# Patient Record
Sex: MALE | Race: BLACK OR AFRICAN AMERICAN | NOT HISPANIC OR LATINO | Employment: OTHER | URBAN - METROPOLITAN AREA
[De-identification: names, ages, dates, MRNs, and addresses within clinical notes are randomized per-mention and may not be internally consistent; named-entity substitution may affect disease eponyms.]

---

## 2018-10-06 ENCOUNTER — HOSPITAL ENCOUNTER (OUTPATIENT)
Facility: HOSPITAL | Age: 63
Discharge: HOME | End: 2018-10-07
Attending: EMERGENCY MEDICINE | Admitting: INTERNAL MEDICINE
Payer: MEDICARE

## 2018-10-06 DIAGNOSIS — N17.9 AKI (ACUTE KIDNEY INJURY): ICD-10-CM

## 2018-10-06 DIAGNOSIS — R73.9 HYPERGLYCEMIA: Primary | ICD-10-CM

## 2018-10-06 DIAGNOSIS — E86.9 VOLUME DEPLETION: ICD-10-CM

## 2018-10-06 DIAGNOSIS — E55.9 VITAMIN D DEFICIENCY: Chronic | ICD-10-CM

## 2018-10-06 DIAGNOSIS — R73.9 HYPERGLYCEMIA WITHOUT KETOSIS: ICD-10-CM

## 2018-10-06 DIAGNOSIS — E11.65 UNCONTROLLED TYPE 2 DIABETES MELLITUS WITH HYPERGLYCEMIA: ICD-10-CM

## 2018-10-06 DIAGNOSIS — R53.1 WEAKNESS: ICD-10-CM

## 2018-10-06 PROBLEM — F19.10 POLYSUBSTANCE ABUSE: Status: ACTIVE | Noted: 2018-10-06

## 2018-10-06 PROBLEM — F14.10 COCAINE ABUSE: Status: ACTIVE | Noted: 2018-10-06

## 2018-10-06 PROBLEM — F10.10 ALCOHOL ABUSE: Status: ACTIVE | Noted: 2018-10-06

## 2018-10-06 LAB
ALBUMIN SERPL BCP-MCNC: 4 G/DL
ALLENS TEST: ABNORMAL
ALP SERPL-CCNC: 97 U/L
ALT SERPL W/O P-5'-P-CCNC: 34 U/L
AMPHET+METHAMPHET UR QL: NEGATIVE
ANION GAP SERPL CALC-SCNC: 16 MMOL/L
ANION GAP SERPL CALC-SCNC: 9 MMOL/L
AST SERPL-CCNC: 34 U/L
B-OH-BUTYR BLD STRIP-SCNC: 1.1 MMOL/L
BACTERIA #/AREA URNS HPF: NORMAL /HPF
BARBITURATES UR QL SCN>200 NG/ML: NEGATIVE
BASOPHILS # BLD AUTO: 0.01 K/UL
BASOPHILS NFR BLD: 0.2 %
BENZODIAZ UR QL SCN>200 NG/ML: NEGATIVE
BILIRUB SERPL-MCNC: 1 MG/DL
BILIRUB UR QL STRIP: NEGATIVE
BNP SERPL-MCNC: <10 PG/ML
BUN SERPL-MCNC: 18 MG/DL
BUN SERPL-MCNC: 23 MG/DL
BZE UR QL SCN: NORMAL
CALCIUM SERPL-MCNC: 10 MG/DL
CALCIUM SERPL-MCNC: 9 MG/DL
CANNABINOIDS UR QL SCN: NEGATIVE
CHLORIDE SERPL-SCNC: 85 MMOL/L
CHLORIDE SERPL-SCNC: 98 MMOL/L
CHOLEST SERPL-MCNC: 202 MG/DL
CHOLEST/HDLC SERPL: 6.7 {RATIO}
CLARITY UR: CLEAR
CO2 SERPL-SCNC: 28 MMOL/L
CO2 SERPL-SCNC: 30 MMOL/L
COLOR UR: YELLOW
CREAT SERPL-MCNC: 1.3 MG/DL
CREAT SERPL-MCNC: 2 MG/DL
CREAT UR-MCNC: 31.9 MG/DL
DELSYS: ABNORMAL
DIFFERENTIAL METHOD: ABNORMAL
EOSINOPHIL # BLD AUTO: 0 K/UL
EOSINOPHIL NFR BLD: 0.4 %
ERYTHROCYTE [DISTWIDTH] IN BLOOD BY AUTOMATED COUNT: 11.7 %
EST. GFR  (AFRICAN AMERICAN): 40 ML/MIN/1.73 M^2
EST. GFR  (AFRICAN AMERICAN): >60 ML/MIN/1.73 M^2
EST. GFR  (NON AFRICAN AMERICAN): 35 ML/MIN/1.73 M^2
EST. GFR  (NON AFRICAN AMERICAN): 59 ML/MIN/1.73 M^2
GLUCOSE SERPL-MCNC: 267 MG/DL
GLUCOSE SERPL-MCNC: 737 MG/DL
GLUCOSE SERPL-MCNC: >500 MG/DL (ref 70–110)
GLUCOSE SERPL-MCNC: >500 MG/DL (ref 70–110)
GLUCOSE UR QL STRIP: ABNORMAL
HCO3 UR-SCNC: 28.4 MMOL/L (ref 24–28)
HCT VFR BLD AUTO: 41.3 %
HDLC SERPL-MCNC: 30 MG/DL
HDLC SERPL: 14.9 %
HGB BLD-MCNC: 13.9 G/DL
HGB UR QL STRIP: NEGATIVE
KETONES UR QL STRIP: ABNORMAL
LACTATE SERPL-SCNC: 1.4 MMOL/L
LDLC SERPL CALC-MCNC: 128.8 MG/DL
LEUKOCYTE ESTERASE UR QL STRIP: NEGATIVE
LIPASE SERPL-CCNC: 31 U/L
LYMPHOCYTES # BLD AUTO: 2 K/UL
LYMPHOCYTES NFR BLD: 35.8 %
MAGNESIUM SERPL-MCNC: 2 MG/DL
MCH RBC QN AUTO: 30.2 PG
MCHC RBC AUTO-ENTMCNC: 33.7 G/DL
MCV RBC AUTO: 90 FL
METHADONE UR QL SCN>300 NG/ML: NEGATIVE
MICROSCOPIC COMMENT: NORMAL
MODE: ABNORMAL
MONOCYTES # BLD AUTO: 0.7 K/UL
MONOCYTES NFR BLD: 12.5 %
NEUTROPHILS # BLD AUTO: 2.9 K/UL
NEUTROPHILS NFR BLD: 50.9 %
NITRITE UR QL STRIP: NEGATIVE
NONHDLC SERPL-MCNC: 172 MG/DL
OPIATES UR QL SCN: NEGATIVE
OSMOLALITY SERPL: 317 MOSM/KG
PCO2 BLDA: 41.7 MMHG (ref 35–45)
PCP UR QL SCN>25 NG/ML: NEGATIVE
PH SMN: 7.44 [PH] (ref 7.35–7.45)
PH UR STRIP: 6 [PH] (ref 5–8)
PHOSPHATE SERPL-MCNC: 2 MG/DL
PLATELET # BLD AUTO: 211 K/UL
PMV BLD AUTO: 10.7 FL
PO2 BLDA: 83 MMHG (ref 80–100)
POC BE: 4 MMOL/L
POC SATURATED O2: 97 % (ref 95–100)
POC TCO2: 30 MMOL/L (ref 23–27)
POCT GLUCOSE: 274 MG/DL (ref 70–110)
POTASSIUM SERPL-SCNC: 3.6 MMOL/L
POTASSIUM SERPL-SCNC: 4.2 MMOL/L
PROT SERPL-MCNC: 7.9 G/DL
PROT UR QL STRIP: NEGATIVE
RBC # BLD AUTO: 4.6 M/UL
SAMPLE: ABNORMAL
SITE: ABNORMAL
SODIUM SERPL-SCNC: 129 MMOL/L
SODIUM SERPL-SCNC: 137 MMOL/L
SODIUM UR-SCNC: <20 MMOL/L
SP GR UR STRIP: <=1.005 (ref 1–1.03)
TOXICOLOGY INFORMATION: NORMAL
TRIGL SERPL-MCNC: 216 MG/DL
TROPONIN I SERPL DL<=0.01 NG/ML-MCNC: <0.006 NG/ML
TSH SERPL DL<=0.005 MIU/L-ACNC: 0.42 UIU/ML
URN SPEC COLLECT METH UR: ABNORMAL
UROBILINOGEN UR STRIP-ACNC: NEGATIVE EU/DL
WBC # BLD AUTO: 5.67 K/UL
YEAST URNS QL MICRO: NORMAL

## 2018-10-06 PROCEDURE — G0378 HOSPITAL OBSERVATION PER HR: HCPCS

## 2018-10-06 PROCEDURE — 25000003 PHARM REV CODE 250: Performed by: EMERGENCY MEDICINE

## 2018-10-06 PROCEDURE — 84100 ASSAY OF PHOSPHORUS: CPT

## 2018-10-06 PROCEDURE — 94761 N-INVAS EAR/PLS OXIMETRY MLT: CPT

## 2018-10-06 PROCEDURE — 83690 ASSAY OF LIPASE: CPT

## 2018-10-06 PROCEDURE — 82962 GLUCOSE BLOOD TEST: CPT

## 2018-10-06 PROCEDURE — 80307 DRUG TEST PRSMV CHEM ANLYZR: CPT

## 2018-10-06 PROCEDURE — 84484 ASSAY OF TROPONIN QUANT: CPT

## 2018-10-06 PROCEDURE — 83880 ASSAY OF NATRIURETIC PEPTIDE: CPT

## 2018-10-06 PROCEDURE — 93005 ELECTROCARDIOGRAM TRACING: CPT

## 2018-10-06 PROCEDURE — 82570 ASSAY OF URINE CREATININE: CPT | Mod: 59

## 2018-10-06 PROCEDURE — 81000 URINALYSIS NONAUTO W/SCOPE: CPT | Mod: 59

## 2018-10-06 PROCEDURE — 25000003 PHARM REV CODE 250: Performed by: INTERNAL MEDICINE

## 2018-10-06 PROCEDURE — 84443 ASSAY THYROID STIM HORMONE: CPT

## 2018-10-06 PROCEDURE — 80048 BASIC METABOLIC PNL TOTAL CA: CPT

## 2018-10-06 PROCEDURE — 84300 ASSAY OF URINE SODIUM: CPT

## 2018-10-06 PROCEDURE — 82306 VITAMIN D 25 HYDROXY: CPT

## 2018-10-06 PROCEDURE — 80061 LIPID PANEL: CPT

## 2018-10-06 PROCEDURE — 63600175 PHARM REV CODE 636 W HCPCS: Performed by: STUDENT IN AN ORGANIZED HEALTH CARE EDUCATION/TRAINING PROGRAM

## 2018-10-06 PROCEDURE — 85025 COMPLETE CBC W/AUTO DIFF WBC: CPT

## 2018-10-06 PROCEDURE — 36600 WITHDRAWAL OF ARTERIAL BLOOD: CPT

## 2018-10-06 PROCEDURE — 83930 ASSAY OF BLOOD OSMOLALITY: CPT

## 2018-10-06 PROCEDURE — 83605 ASSAY OF LACTIC ACID: CPT

## 2018-10-06 PROCEDURE — 84300 ASSAY OF URINE SODIUM: CPT | Mod: 91

## 2018-10-06 PROCEDURE — 99285 EMERGENCY DEPT VISIT HI MDM: CPT | Mod: 25

## 2018-10-06 PROCEDURE — 63600175 PHARM REV CODE 636 W HCPCS: Performed by: INTERNAL MEDICINE

## 2018-10-06 PROCEDURE — 96374 THER/PROPH/DIAG INJ IV PUSH: CPT

## 2018-10-06 PROCEDURE — 82010 KETONE BODYS QUAN: CPT

## 2018-10-06 PROCEDURE — 82803 BLOOD GASES ANY COMBINATION: CPT

## 2018-10-06 PROCEDURE — 63600175 PHARM REV CODE 636 W HCPCS: Performed by: EMERGENCY MEDICINE

## 2018-10-06 PROCEDURE — 83735 ASSAY OF MAGNESIUM: CPT

## 2018-10-06 PROCEDURE — 96361 HYDRATE IV INFUSION ADD-ON: CPT

## 2018-10-06 PROCEDURE — 80053 COMPREHEN METABOLIC PANEL: CPT

## 2018-10-06 RX ORDER — SODIUM CHLORIDE 0.9 % (FLUSH) 0.9 %
5 SYRINGE (ML) INJECTION
Status: DISCONTINUED | OUTPATIENT
Start: 2018-10-06 | End: 2018-10-07 | Stop reason: HOSPADM

## 2018-10-06 RX ORDER — SODIUM,POTASSIUM PHOSPHATES 280-250MG
1 POWDER IN PACKET (EA) ORAL
Status: DISCONTINUED | OUTPATIENT
Start: 2018-10-06 | End: 2018-10-07 | Stop reason: HOSPADM

## 2018-10-06 RX ORDER — SODIUM CHLORIDE 9 MG/ML
1000 INJECTION, SOLUTION INTRAVENOUS
Status: COMPLETED | OUTPATIENT
Start: 2018-10-06 | End: 2018-10-06

## 2018-10-06 RX ORDER — GLUCAGON 1 MG
1 KIT INJECTION
Status: DISCONTINUED | OUTPATIENT
Start: 2018-10-06 | End: 2018-10-07 | Stop reason: HOSPADM

## 2018-10-06 RX ORDER — SODIUM CHLORIDE 9 MG/ML
INJECTION, SOLUTION INTRAVENOUS CONTINUOUS
Status: DISCONTINUED | OUTPATIENT
Start: 2018-10-06 | End: 2018-10-06

## 2018-10-06 RX ORDER — IBUPROFEN 200 MG
16 TABLET ORAL
Status: DISCONTINUED | OUTPATIENT
Start: 2018-10-06 | End: 2018-10-07 | Stop reason: HOSPADM

## 2018-10-06 RX ORDER — INSULIN ASPART 100 [IU]/ML
0-5 INJECTION, SOLUTION INTRAVENOUS; SUBCUTANEOUS
Status: DISCONTINUED | OUTPATIENT
Start: 2018-10-06 | End: 2018-10-07 | Stop reason: HOSPADM

## 2018-10-06 RX ORDER — IBUPROFEN 200 MG
24 TABLET ORAL
Status: DISCONTINUED | OUTPATIENT
Start: 2018-10-06 | End: 2018-10-07 | Stop reason: HOSPADM

## 2018-10-06 RX ORDER — HEPARIN SODIUM 5000 [USP'U]/ML
5000 INJECTION, SOLUTION INTRAVENOUS; SUBCUTANEOUS EVERY 12 HOURS
Status: DISCONTINUED | OUTPATIENT
Start: 2018-10-06 | End: 2018-10-07 | Stop reason: HOSPADM

## 2018-10-06 RX ORDER — ASPIRIN 81 MG/1
81 TABLET ORAL DAILY
Status: DISCONTINUED | OUTPATIENT
Start: 2018-10-07 | End: 2018-10-07 | Stop reason: HOSPADM

## 2018-10-06 RX ADMIN — INSULIN HUMAN 10 UNITS: 100 INJECTION, SOLUTION PARENTERAL at 06:10

## 2018-10-06 RX ADMIN — SODIUM CHLORIDE 1000 ML: 0.9 INJECTION, SOLUTION INTRAVENOUS at 04:10

## 2018-10-06 RX ADMIN — POTASSIUM & SODIUM PHOSPHATES POWDER PACK 280-160-250 MG 1 PACKET: 280-160-250 PACK at 10:10

## 2018-10-06 RX ADMIN — SODIUM CHLORIDE 1000 ML: 0.9 INJECTION, SOLUTION INTRAVENOUS at 06:10

## 2018-10-06 RX ADMIN — SODIUM CHLORIDE: 0.9 INJECTION, SOLUTION INTRAVENOUS at 08:10

## 2018-10-06 RX ADMIN — INSULIN DETEMIR 10 UNITS: 100 INJECTION, SOLUTION SUBCUTANEOUS at 10:10

## 2018-10-06 RX ADMIN — POTASSIUM ACETATE: 3.93 INJECTION, SOLUTION, CONCENTRATE INTRAVENOUS at 10:10

## 2018-10-06 RX ADMIN — HEPARIN SODIUM 5000 UNITS: 5000 INJECTION, SOLUTION INTRAVENOUS; SUBCUTANEOUS at 10:10

## 2018-10-06 NOTE — ED NOTES
Patient admits to being a non-compliant diabetic over the last year.  CC today is weakness.  States he does use alcohol and crack but not on a daily basis.  Also states he has lost significant weight over last year

## 2018-10-06 NOTE — ED PROVIDER NOTES
Encounter Date: 10/6/2018    SCRIBE #1 NOTE: I, Leroy Hernandez, am scribing for, and in the presence of,  Dr. Chacko. I have scribed the entire note.       History     Chief Complaint   Patient presents with    Weakness     pt hasnt taken diabetic meds in 1 year, no having polyuria, weakness, sob and back pain.     Isreal Biggs Jr. is a 63 y.o. male who  has a past medical history of Diabetes mellitus, Hypertension, and Renal disorder.    The patient presents to the ED due to weakness. The patient states he is Diabetic but hasn't taken his medication in over a year. The patient states he stopped taking his medication because of the side effects. The patient reports polyuria, weakness, sob, and back pain. The patient also notes cigarette, alcohol, and drug abuse. The patient reports no other symptoms at this time.      The history is provided by the patient.     Review of patient's allergies indicates:  No Known Allergies  Past Medical History:   Diagnosis Date    Diabetes mellitus     Hypertension     Renal disorder      Past Surgical History:   Procedure Laterality Date    BRAIN SURGERY       History reviewed. No pertinent family history.  Social History     Tobacco Use    Smoking status: Current Every Day Smoker     Packs/day: 0.50     Years: 30.00     Pack years: 15.00     Types: Cigarettes   Substance Use Topics    Alcohol use: Yes     Comment: socially    Drug use: No     Review of Systems   Constitutional: Negative for fever.   HENT: Negative for sore throat.    Respiratory: Positive for shortness of breath.    Cardiovascular: Negative for chest pain.   Gastrointestinal: Negative for nausea.   Genitourinary: Positive for frequency. Negative for dysuria.   Musculoskeletal: Positive for back pain.   Skin: Negative for rash.   Neurological: Positive for weakness.   Hematological: Does not bruise/bleed easily.       Physical Exam     Initial Vitals [10/06/18 1536]   BP Pulse Resp Temp SpO2   105/76 92  16 -- 96 %      MAP       --         Physical Exam    Nursing note and vitals reviewed.  Constitutional: He appears well-developed and well-nourished. He is not diaphoretic. No distress.   Cachectic.   HENT:   Head: Normocephalic and atraumatic.   Mouth/Throat: Oropharynx is clear and moist.   Eyes: Conjunctivae and EOM are normal.   Neck: Normal range of motion. Neck supple.   Cardiovascular: Normal rate, regular rhythm and normal heart sounds. Exam reveals no gallop and no friction rub.    No murmur heard.  Pulmonary/Chest: Breath sounds normal. He has no wheezes. He has no rhonchi. He has no rales.   Abdominal: Soft. There is no tenderness. There is no rebound and no guarding.   Musculoskeletal: Normal range of motion. He exhibits no edema or tenderness.   Lymphadenopathy:     He has no cervical adenopathy.   Neurological: He is alert and oriented to person, place, and time. He has normal strength.   Skin: Skin is warm and dry. No rash noted.         ED Course   Procedures  Labs Reviewed   CBC W/ AUTO DIFFERENTIAL - Abnormal; Notable for the following components:       Result Value    Hemoglobin 13.9 (*)     All other components within normal limits   COMPREHENSIVE METABOLIC PANEL - Abnormal; Notable for the following components:    Sodium 129 (*)     Chloride 85 (*)     Glucose 737 (*)     Creatinine 2.0 (*)     eGFR if  40 (*)     eGFR if non  35 (*)     All other components within normal limits    Narrative:     GLU critical result(s) called and verbal readback obtained from Lucho Rowe RN on 10/06/18 at 17:16 by Bekah Poon., 10/06/2018 17:16   URINALYSIS, REFLEX TO URINE CULTURE - Abnormal; Notable for the following components:    Specific Gravity, UA <=1.005 (*)     Glucose, UA 3+ (*)     Ketones, UA 1+ (*)     All other components within normal limits    Narrative:     Preferred Collection Type->Urine, Clean Catch   POCT GLUCOSE MONITORING CONTINUOUS - Abnormal   B-TYPE  NATRIURETIC PEPTIDE   DRUG SCREEN PANEL, URINE EMERGENCY    Narrative:     Preferred Collection Type->Urine, Clean Catch   LACTIC ACID, PLASMA   LIPASE   TROPONIN I   MAGNESIUM   URINALYSIS MICROSCOPIC    Narrative:     Preferred Collection Type->Urine, Clean Catch     EKG Readings: (Independently Interpreted)   Initial Reading: No STEMI. Heart Rate: 77. Ectopy: No Ectopy. ST Segments: Normal ST Segments. Axis: Left Axis Deviation. Other Impression: LVH   1703       Imaging Results          X-Ray Chest PA And Lateral (Final result)  Result time 10/06/18 16:37:03    Final result by Lani Milan MD (10/06/18 16:37:03)                 Impression:      Interval clearing of abnormal pulmonary parenchymal opacification with no other interval change since February 18, 2015.      Electronically signed by: Lani Milan MD  Date:    10/06/2018  Time:    16:37             Narrative:    EXAMINATION:  XR CHEST PA AND LATERAL    CLINICAL HISTORY:  Weakness    TECHNIQUE:  PA and lateral views of the chest were performed.    COMPARISON:  February 18, 2015    FINDINGS:  Mild dilatation of the trachea remains.The lungs are over expanded but free of lobar consolidation and alveolar edema, with normal appearance of pulmonary vasculature and no pleural effusion or pneumothorax.    The cardiac silhouette is normal in size. The hilar and mediastinal contours are unremarkable.    Bones are intact.                                 Medical Decision Making:   Initial Assessment:   Isreal Biggs Jr. is a 63 y.o. male who presents to the ED due to weakness. His glucose is 737.  The patient was given IV fluids and 10 units of insulin IV %period% creatinine is 2.0.  The patient will be admitted for glucose control and hydration status.  Clinical Tests:   Lab Tests: Ordered and Reviewed  Radiological Study: Ordered and Reviewed  Medical Tests: Ordered and Reviewed                   ED Course as of Oct 06 1833   Sat Oct 06, 2018   1833  [facility] internal Medicine consulted.  Patient is a [facility] patient.  [ST]      ED Course User Index  [ST] Nuha Chacko MD     Clinical Impression:     1. Hyperglycemia    2. Weakness             I, Nuha Chacko, personally performed the services described in this documentation. All medical record entries made by the scribe were at my direction and in my presence.  I have reviewed the chart and agree that the record reflects my personal performance and is accurate and complete. Nuha Chacko M.D. 6:32 PM10/06/2018                    Nuha Chacko MD  10/06/18 1833       Nuha Chacko MD  10/06/18 2044

## 2018-10-07 VITALS
TEMPERATURE: 98 F | HEIGHT: 64 IN | RESPIRATION RATE: 17 BRPM | DIASTOLIC BLOOD PRESSURE: 85 MMHG | WEIGHT: 120.38 LBS | OXYGEN SATURATION: 95 % | HEART RATE: 56 BPM | BODY MASS INDEX: 20.55 KG/M2 | SYSTOLIC BLOOD PRESSURE: 139 MMHG

## 2018-10-07 PROBLEM — E83.39 HYPOPHOSPHATEMIA: Status: ACTIVE | Noted: 2018-10-07

## 2018-10-07 PROBLEM — E78.5 HYPERLIPIDEMIA ASSOCIATED WITH TYPE 2 DIABETES MELLITUS: Status: ACTIVE | Noted: 2018-10-07

## 2018-10-07 PROBLEM — E11.69 HYPERLIPIDEMIA ASSOCIATED WITH TYPE 2 DIABETES MELLITUS: Status: ACTIVE | Noted: 2018-10-07

## 2018-10-07 LAB
25(OH)D3+25(OH)D2 SERPL-MCNC: 26 NG/ML
25(OH)D3+25(OH)D2 SERPL-MCNC: 32 NG/ML
ALBUMIN SERPL BCP-MCNC: 3 G/DL
ALP SERPL-CCNC: 60 U/L
ALT SERPL W/O P-5'-P-CCNC: 26 U/L
ANION GAP SERPL CALC-SCNC: 7 MMOL/L
AST SERPL-CCNC: 33 U/L
BASOPHILS # BLD AUTO: 0 K/UL
BASOPHILS NFR BLD: 0 %
BILIRUB SERPL-MCNC: 1 MG/DL
BUN SERPL-MCNC: 14 MG/DL
CALCIUM SERPL-MCNC: 8.4 MG/DL
CHLORIDE SERPL-SCNC: 102 MMOL/L
CO2 SERPL-SCNC: 30 MMOL/L
CREAT SERPL-MCNC: 1.1 MG/DL
CREAT UR-MCNC: 102.4 MG/DL
DIFFERENTIAL METHOD: ABNORMAL
EOSINOPHIL # BLD AUTO: 0 K/UL
EOSINOPHIL NFR BLD: 0.6 %
ERYTHROCYTE [DISTWIDTH] IN BLOOD BY AUTOMATED COUNT: 11.5 %
EST. GFR  (AFRICAN AMERICAN): >60 ML/MIN/1.73 M^2
EST. GFR  (NON AFRICAN AMERICAN): >60 ML/MIN/1.73 M^2
ESTIMATED AVG GLUCOSE: ABNORMAL MG/DL
FERRITIN SERPL-MCNC: 304 NG/ML
GLUCOSE SERPL-MCNC: 241 MG/DL
HBA1C MFR BLD HPLC: >14 %
HCT VFR BLD AUTO: 36.4 %
HGB BLD-MCNC: 12.5 G/DL
IRON SERPL-MCNC: 104 UG/DL
LYMPHOCYTES # BLD AUTO: 3 K/UL
LYMPHOCYTES NFR BLD: 58.6 %
MCH RBC QN AUTO: 30.6 PG
MCHC RBC AUTO-ENTMCNC: 34.3 G/DL
MCV RBC AUTO: 89 FL
MONOCYTES # BLD AUTO: 0.5 K/UL
MONOCYTES NFR BLD: 9.4 %
NEUTROPHILS # BLD AUTO: 1.6 K/UL
NEUTROPHILS NFR BLD: 31.2 %
PLATELET # BLD AUTO: 190 K/UL
PMV BLD AUTO: 10.3 FL
POCT GLUCOSE: 241 MG/DL (ref 70–110)
POCT GLUCOSE: 263 MG/DL (ref 70–110)
POTASSIUM SERPL-SCNC: 4.2 MMOL/L
PROT SERPL-MCNC: 6.3 G/DL
RBC # BLD AUTO: 4.09 M/UL
SATURATED IRON: 36 %
SODIUM SERPL-SCNC: 139 MMOL/L
SODIUM UR-SCNC: 48 MMOL/L
TOTAL IRON BINDING CAPACITY: 287 UG/DL
TRANSFERRIN SERPL-MCNC: 194 MG/DL
TRANSFERRIN SERPL-MCNC: 194 MG/DL
WBC # BLD AUTO: 5.12 K/UL

## 2018-10-07 PROCEDURE — G0378 HOSPITAL OBSERVATION PER HR: HCPCS

## 2018-10-07 PROCEDURE — 90670 PCV13 VACCINE IM: CPT | Performed by: INTERNAL MEDICINE

## 2018-10-07 PROCEDURE — G0009 ADMIN PNEUMOCOCCAL VACCINE: HCPCS | Performed by: INTERNAL MEDICINE

## 2018-10-07 PROCEDURE — 25000003 PHARM REV CODE 250: Performed by: INTERNAL MEDICINE

## 2018-10-07 PROCEDURE — 83036 HEMOGLOBIN GLYCOSYLATED A1C: CPT

## 2018-10-07 PROCEDURE — 85025 COMPLETE CBC W/AUTO DIFF WBC: CPT

## 2018-10-07 PROCEDURE — 80053 COMPREHEN METABOLIC PANEL: CPT

## 2018-10-07 PROCEDURE — 94761 N-INVAS EAR/PLS OXIMETRY MLT: CPT

## 2018-10-07 PROCEDURE — 90472 IMMUNIZATION ADMIN EACH ADD: CPT | Performed by: INTERNAL MEDICINE

## 2018-10-07 PROCEDURE — 63600175 PHARM REV CODE 636 W HCPCS: Performed by: INTERNAL MEDICINE

## 2018-10-07 PROCEDURE — 90686 IIV4 VACC NO PRSV 0.5 ML IM: CPT | Performed by: INTERNAL MEDICINE

## 2018-10-07 PROCEDURE — G0008 ADMIN INFLUENZA VIRUS VAC: HCPCS | Performed by: INTERNAL MEDICINE

## 2018-10-07 PROCEDURE — 63600175 PHARM REV CODE 636 W HCPCS: Performed by: STUDENT IN AN ORGANIZED HEALTH CARE EDUCATION/TRAINING PROGRAM

## 2018-10-07 PROCEDURE — 82728 ASSAY OF FERRITIN: CPT

## 2018-10-07 PROCEDURE — 83540 ASSAY OF IRON: CPT

## 2018-10-07 PROCEDURE — 36415 COLL VENOUS BLD VENIPUNCTURE: CPT

## 2018-10-07 PROCEDURE — 90471 IMMUNIZATION ADMIN: CPT | Performed by: INTERNAL MEDICINE

## 2018-10-07 PROCEDURE — 25000003 PHARM REV CODE 250: Performed by: STUDENT IN AN ORGANIZED HEALTH CARE EDUCATION/TRAINING PROGRAM

## 2018-10-07 PROCEDURE — 82306 VITAMIN D 25 HYDROXY: CPT

## 2018-10-07 RX ORDER — METFORMIN HYDROCHLORIDE 500 MG/1
500 TABLET ORAL 2 TIMES DAILY WITH MEALS
Qty: 180 TABLET | Refills: 3 | Status: SHIPPED | OUTPATIENT
Start: 2018-10-07 | End: 2019-10-07

## 2018-10-07 RX ORDER — INSULIN PUMP SYRINGE, 3 ML
EACH MISCELLANEOUS
Refills: 0 | COMMUNITY
Start: 2018-10-07 | End: 2019-10-07

## 2018-10-07 RX ORDER — ATORVASTATIN CALCIUM 40 MG/1
40 TABLET, FILM COATED ORAL DAILY
Qty: 90 TABLET | Refills: 3 | Status: SHIPPED | OUTPATIENT
Start: 2018-10-07 | End: 2019-10-07

## 2018-10-07 RX ORDER — INSULIN GLARGINE 100 [IU]/ML
10 INJECTION, SOLUTION SUBCUTANEOUS NIGHTLY
Qty: 3 ML | Refills: 5 | Status: SHIPPED | OUTPATIENT
Start: 2018-10-07 | End: 2019-10-07

## 2018-10-07 RX ORDER — LANCETS
1 EACH MISCELLANEOUS EVERY MORNING
Qty: 1 EACH | Refills: 0 | Status: SHIPPED | OUTPATIENT
Start: 2018-10-07

## 2018-10-07 RX ADMIN — ASPIRIN 81 MG: 81 TABLET, COATED ORAL at 09:10

## 2018-10-07 RX ADMIN — HEPARIN SODIUM 5000 UNITS: 5000 INJECTION, SOLUTION INTRAVENOUS; SUBCUTANEOUS at 09:10

## 2018-10-07 RX ADMIN — INSULIN ASPART 2 UNITS: 100 INJECTION, SOLUTION INTRAVENOUS; SUBCUTANEOUS at 06:10

## 2018-10-07 RX ADMIN — POTASSIUM & SODIUM PHOSPHATES POWDER PACK 280-160-250 MG 1 PACKET: 280-160-250 PACK at 07:10

## 2018-10-07 RX ADMIN — POTASSIUM & SODIUM PHOSPHATES POWDER PACK 280-160-250 MG 1 PACKET: 280-160-250 PACK at 12:10

## 2018-10-07 RX ADMIN — PNEUMOCOCCAL 13-VALENT CONJUGATE VACCINE 0.5 ML: 2.2; 2.2; 2.2; 2.2; 2.2; 4.4; 2.2; 2.2; 2.2; 2.2; 2.2; 2.2; 2.2 INJECTION, SUSPENSION INTRAMUSCULAR at 12:10

## 2018-10-07 RX ADMIN — POTASSIUM ACETATE: 3.93 INJECTION, SOLUTION, CONCENTRATE INTRAVENOUS at 05:10

## 2018-10-07 RX ADMIN — INSULIN ASPART 3 UNITS: 100 INJECTION, SOLUTION INTRAVENOUS; SUBCUTANEOUS at 12:10

## 2018-10-07 RX ADMIN — INFLUENZA A VIRUS A/MICHIGAN/45/2015 X-275 (H1N1) ANTIGEN (FORMALDEHYDE INACTIVATED), INFLUENZA A VIRUS A/SINGAPORE/INFIMH-16-0019/2016 IVR-186 (H3N2) ANTIGEN (FORMALDEHYDE INACTIVATED), INFLUENZA B VIRUS B/PHUKET/3073/2013 ANTIGEN (FORMALDEHYDE INACTIVATED), AND INFLUENZA B VIRUS B/MARYLAND/15/2016 BX-69A ANTIGEN (FORMALDEHYDE INACTIVATED) 0.5 ML: 15; 15; 15; 15 INJECTION, SUSPENSION INTRAMUSCULAR at 12:10

## 2018-10-07 NOTE — PLAN OF CARE
Problem: Patient Care Overview  Goal: Plan of Care Review  Outcome: Ongoing (interventions implemented as appropriate)  Pt in nAD. V/s stable. AAOx4. PIV infusing NS with 40 mEq of K at 150 mL/hr per MD order dressing CDI. Continuous cardiac monitoring NSR. Blood glucose monitoring continued. Encouraged to call for assistance verbalized understanding. Safety maintained bed in low locked position, SR up X2, bed alarm on, and call bell in reach. Will continue to monitor.

## 2018-10-07 NOTE — PLAN OF CARE
Discharge orders noted, no HH or HME ordered.    Pt's nurse will go over medications/signs and symptoms prior to discharge       10/07/18 1140   Final Note   Assessment Type Final Discharge Note   Discharge Disposition Home   What phone number can be called within the next 1-3 days to see how you are doing after discharge? [phone]   Hospital Follow Up  Appt(s) scheduled? No  (Ambulatory Referral Placed with Internal Medicine, their offices should contact patient with appointment information.)   Right Care Referral Info   Post Acute Recommendation No Care     Ping Arzola RN Transitional Navigator  [phone]

## 2018-10-07 NOTE — PROGRESS NOTES
"[facility] IM Resident HO- 2 Progress Note    Subjective:      Isreal Biggs Jr. is a 63 y.o. male with T2DM, HTN, polysubstance abuse who is being followed by the [facility] IM service at [facility] for hypglycemia.    Overnight patient feels much improved. Tolerating PO, good UOP. Denies HA, CP, SOB, fever, n/v/d.     Objective:   Last 24 Hour Vital Signs:  BP  Min: 102/69  Max: 147/84  Temp  Av.3 °F (36.8 °C)  Min: 97.8 °F (36.6 °C)  Max: 98.8 °F (37.1 °C)  Pulse  Av.7  Min: 63  Max: 92  Resp  Av.3  Min: 5  Max: 19  SpO2  Av.9 %  Min: 96 %  Max: 99 %  Height  Av' 4" (162.6 cm)  Min: 5' 4" (162.6 cm)  Max: 5' 4" (162.6 cm)  Weight  Av.2 kg (119 lb 7.6 oz)  Min: 54 kg (119 lb)  Max: 54.6 kg (120 lb 5.9 oz)  No intake/output data recorded.    Physical Examination:  General appearance: A&OX3, appears stated age and cooperative, thin appearing  HEENT: conjunctivae/corneas clear. PERRL, EOM's intact. 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 No wheezes or crackles.   Heart: RRR, S1, S2 normal, no murmur, click, rub or gallop  Abdomen: soft, non-tender; bowel sounds normal; no masses, liver edge palpated 2 cm below costal margin  Neuro: CN grossly intact. No focal motor or sensory deficit. Strength 5/5 throughout  Extremities: Appears normal. 2+ pulse. FROM. No edema.  Skin: turgor improved. No rashes or lesions. No skin breakdown    Laboratory:  Laboratory Data Reviewed:   Pertinent Findings:  Lab Results   Component Value Date    WBC 5.12 10/07/2018    HGB 12.5 (L) 10/07/2018    HCT 36.4 (L) 10/07/2018    MCV 89 10/07/2018     10/07/2018     CMP  Sodium   Date Value Ref Range Status   10/07/2018 139 136 - 145 mmol/L Final     Potassium   Date Value Ref Range Status   10/07/2018 4.2 3.5 - 5.1 mmol/L Final     Chloride   Date Value Ref Range Status "   10/07/2018 102 95 - 110 mmol/L Final     CO2   Date Value Ref Range Status   10/07/2018 30 (H) 23 - 29 mmol/L Final     Glucose   Date Value Ref Range Status   10/07/2018 241 (H) 70 - 110 mg/dL Final     BUN, Bld   Date Value Ref Range Status   10/07/2018 14 8 - 23 mg/dL Final     Creatinine   Date Value Ref Range Status   10/07/2018 1.1 0.5 - 1.4 mg/dL Final     Calcium   Date Value Ref Range Status   10/07/2018 8.4 (L) 8.7 - 10.5 mg/dL Final     Total Protein   Date Value Ref Range Status   10/07/2018 6.3 6.0 - 8.4 g/dL Final     Albumin   Date Value Ref Range Status   10/07/2018 3.0 (L) 3.5 - 5.2 g/dL Final     Total Bilirubin   Date Value Ref Range Status   10/07/2018 1.0 0.1 - 1.0 mg/dL Final     Comment:     For infants and newborns, interpretation of results should be based  on gestational age, weight and in agreement with clinical  observations.  Premature Infant recommended reference ranges:  Up to 24 hours.............<8.0 mg/dL  Up to 48 hours............<12.0 mg/dL  3-5 days..................<15.0 mg/dL  6-29 days.................<15.0 mg/dL       Alkaline Phosphatase   Date Value Ref Range Status   10/07/2018 60 55 - 135 U/L Final     AST   Date Value Ref Range Status   10/07/2018 33 10 - 40 U/L Final     ALT   Date Value Ref Range Status   10/07/2018 26 10 - 44 U/L Final     Anion Gap   Date Value Ref Range Status   10/07/2018 7 (L) 8 - 16 mmol/L Final     eGFR if    Date Value Ref Range Status   10/07/2018 >60 >60 mL/min/1.73 m^2 Final     eGFR if non    Date Value Ref Range Status   10/07/2018 >60 >60 mL/min/1.73 m^2 Final     Comment:     Calculation used to obtain the estimated glomerular filtration  rate (eGFR) is the CKD-EPI equation.            Current Medications:     Infusions:   custom IV infusion builder 150 mL/hr at 10/07/18 0511        Scheduled:   aspirin  81 mg Oral Daily    heparin (porcine)  5,000 Units Subcutaneous Q12H    insulin detemir U-100   10 Units Subcutaneous QHS    potassium, sodium phosphates  1 packet Oral QID (WM & HS)        PRN:  dextrose 50%, dextrose 50%, dextrose 50%, dextrose 50%, glucagon (human recombinant), glucagon (human recombinant), glucose, glucose, glucose, glucose, influenza, insulin aspart U-100, pneumoc 13-gisselle conj-dip cr(PF), sodium chloride 0.9%    Antibiotics and Day Number of Therapy:  Antibiotics (From admission, onward)    None          Lines and Day Number of Therapy:  PIV day 2    Assessment:     Isreal Biggs Jr. is a 63 y.o.male with  Patient Active Problem List    Diagnosis Date Noted    Hyperglycemia without ketosis 10/06/2018    KELVIN (acute kidney injury) 10/06/2018    Cocaine abuse 10/06/2018    Alcohol abuse 10/06/2018    Polysubstance abuse 10/06/2018    Vitamin D deficiency 02/20/2015    Volume depletion 02/19/2015    Sepsis 02/19/2015    Lactic acidosis 02/19/2015    Anemia 02/19/2015    DM (diabetes mellitus), type 2, uncontrolled 02/19/2015    HTN (hypertension) 02/19/2015    Tobacco abuse 02/19/2015    CAP (community acquired pneumonia) 02/18/2015    DM (diabetes mellitus) 02/18/2015    Hyperglycemia 02/18/2015    Hypercalcemia 02/18/2015    Pneumonia 02/18/2015        Plan:     Hyperglycemia without ketosis/T2DM  - noncompliant with medications   on admission  - ABG 7.44/42/83, urine ketones 1+, betahydroxybuterate 1.1, serum Osm pending  - s/p 1 L NS bolus in ED and insulin 10 units, started NS @ 150 cc/hr  - K wnl, supplementing orally, will monitor  - Levermir 12 units with SSI  - ordered A1C     Volume Depletion, improving  -one month history of polyuria and weight loss  -clinically improved on exam today  -will continue MIVF as above and monitor fluid status     KELVIN, improving  -baseline Cr 0.9  Cr 2.0 on admission  -FEna <1% suggesting pre-renal  -Cr nearing baseline, 1.1 this AM     HTN  -noncompliant with home ARB  -normotensive on admission, will monitor    HLD  associated with T2DM  -Chol 202, HDL 30, ,   -ASCVD 26.4%  -will start Atorvastatin 40 mg daily     Normocytic Anemia OCD  -Hbg 13.6 on admission, baseline 10.5  -Hbg 12.5 this AM  -TSH wnl, ferritin 304, Iron panel pending     Hypophosphatemia  -Phos 2.0 on arrival, likely 2/2 poor nutrition   -replete orally, monitor    Polysubstance abuse  -15+ pack year smoking history, regular binge drinking, cocaine positive Utox  -pre-contemplative stage of change  -counseled patient of the risks of continued behavior     Lines: PIV  Diet: Diabetic  DVT: Heparin  Code: Full     Dispo: stable for discharge today    Pal Berry MD  [facility] Internal [facility] HO-II  [facility] IM Service Team A    \[facility] [place] [facility]\"" Medicine Hospitalist Pager numbers:   [facility] Hospitalist Medicine Team A (Nahomi/Deion): [phone]  [facility] Hospitalist Medicine Team B (Clive/Jada):  [phone]

## 2018-10-07 NOTE — H&P
[facility] History and Physical - Resident Note    Admitting Team: [facility] Team A  Attending Physician: Nahomi  Resident: Sonali  Interns: Jmaal    Date of Admit: 10/6/2018    Chief Complaint     Weakness (pt hasnt taken diabetic meds in 1 year, no having polyuria, weakness, sob and back pain.)   for 1 month    Subjective:      History of Present Illness:  Isreal Biggs Jr. is a 63 y.o. male who  has a past medical history of Diabetes mellitus, Hypertension, and Renal disorder.. The patient presented to [facility] on 10/6/2018 with a primary complaint of Weakness (pt hasnt taken diabetic meds in 1 year, no having polyuria, weakness, sob and back pain.)    At baseline, pt performs all ADL's, able to walk without limitation. Approximately 1 year ago, pt moved to [place] and was lost the healthcare follow up. He stopped taking his medications for T2DM (Metformin and Lantus by chart review, but patient denies ever being on insulin) and HTN (Fosinopril). Roughly one month prior to arrival, he began having generalized weakness and fatigue, accompanied by polydipsia, polyuria, and weight loss (11 lbs since last admission here in 2/15). He presented today because he has been puting off coming to the hospital for a family BBQ. Of note, pt regularly binge drinks a 12 pack plus a pint of liquor, smokes cigarettes, and uses intranasal cocaine. He denies HA, SOB, CP, abdominal pain, n/v/d/c, dizziness, skin lesions, joint pain, numbness or focal weakness.    Pertinent positives and negatives are listed in HPI.  All other systems are reviewed and are negative.    Past Medical History:  Past Medical History:   Diagnosis Date    Diabetes mellitus     Hypertension     Renal disorder        Past Surgical History:  Past Surgical History:   Procedure Laterality Date    BRAIN SURGERY         Allergies:  Review of patient's allergies indicates:  No Known Allergies    Home Medications:  Prior to Admission  "medications    Medication Sig Start Date End Date Taking? Authorizing Provider   aspirin (ECOTRIN) 81 MG EC tablet Take 81 mg by mouth once daily.    Historical Provider, MD   blood sugar diagnostic Strp 1 strip by Misc.(Non-Drug; Combo Route) route once daily. 2/20/15   Ronnie Juan MD   blood-glucose meter (FREESTYLE SYSTEM KIT) kit Use as instructed 2/20/15 2/20/16  Ronnie Juan MD   ergocalciferol (ERGOCALCIFEROL) 50,000 unit Cap Take 1 capsule (50,000 Units total) by mouth every 7 days. 2/20/15   Jolanta Moya MD   fosinopril (MONOPRIL) 20 MG tablet Take 20 mg by mouth once daily.    Historical Provider, MD   insulin glargine (LANTUS SOLOSTAR) 100 unit/mL (3 mL) InPn pen Inject 10 Units into the skin every evening. 2/20/15 2/20/16  Jolanta Moya MD   lancets (ACCU-CHEK SOFTCLIX LANCETS) Misc 1 each by Misc.(Non-Drug; Combo Route) route once daily. 2/20/15   Ronnie Juan MD   metformin (GLUCOPHAGE) 1000 MG tablet Take 1,000 mg by mouth 2 (two) times daily with meals.    Historical Provider, MD   sildenafil (VIAGRA) 100 MG tablet Take 100 mg by mouth daily as needed.    Historical Provider, MD       Family History:  History reviewed. No pertinent family history.    Social History:  Social History     Tobacco Use    Smoking status: Current Every Day Smoker     Packs/day: 0.50     Years: 30.00     Pack years: 15.00     Types: Cigarettes   Substance Use Topics    Alcohol use: Yes     Comment: socially    Drug use: No       Health Maintaince :   Primary Care Physician: Juanita  Immunizations:   TDap is not up to date  Influenza is not up to date  Pneumovax is not up to date  Cancer Screening:  Colonoscopy: is not up to date. FOBT <1 year ago     Objective:   Last 24 Hour Vital Signs:  BP  Min: 105/76  Max: 127/78  Pulse  Av  Min: 72  Max: 92  Resp  Avg: 15.3  Min: 11  Max: 19  SpO2  Av %  Min: 96 %  Max: 99 %  Height  Av' 4" (162.6 cm)  Min: 5' 4" (162.6 cm)  Max: 5' 4" (162.6 cm)  Weight  " Av kg (119 lb)  Min: 54 kg (119 lb)  Max: 54 kg (119 lb)  Body mass index is 20.43 kg/m².  No intake/output data recorded.    Physical Examination:    General appearance: A&OX3, appears stated age and cooperative, thing appearing  HEENT: conjunctivae/corneas clear. PERRL, EOM's intact. 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 No wheezes or crackles.   Heart: RRR, S1, S2 normal, no murmur, click, rub or gallop  Abdomen: soft, non-tender; bowel sounds normal; no masses, liver edge palpated 2 cm below costal margin  Neuro: CN grossly intact. No focal motor or sensory deficit. Strength 5/5 throughout  Extremities: Appears normal. 2+ pulse. FROM. No edema.  Skin: turgor increased noted over sternum. No rashes or lesions. No skin breakdown    Laboratory:  Most Recent Data:  CBC:   Lab Results   Component Value Date    WBC 5.67 10/06/2018    HGB 13.9 (L) 10/06/2018    HCT 41.3 10/06/2018     10/06/2018    MCV 90 10/06/2018    RDW 11.7 10/06/2018     BMP:   Lab Results   Component Value Date     (L) 10/06/2018    K 4.2 10/06/2018    CL 85 (L) 10/06/2018    CO2 28 10/06/2018    BUN 23 10/06/2018    CREATININE 2.0 (H) 10/06/2018     (HH) 10/06/2018    CALCIUM 10.0 10/06/2018    MG 2.0 10/06/2018    PHOS 1.9 (L) 2015     LFTs:   Lab Results   Component Value Date    PROT 7.9 10/06/2018    ALBUMIN 4.0 10/06/2018    BILITOT 1.0 10/06/2018    AST 34 10/06/2018    ALKPHOS 97 10/06/2018    ALT 34 10/06/2018     Coags: No results found for: INR, PROTIME, PTT  FLP:   Lab Results   Component Value Date    CHOL 96 (L) 2015    HDL 15 (L) 2015    LDLCALC 52.0 (L) 2015    TRIG 145 2015    CHOLHDL 15.6 (L) 2015     DM:   Lab Results   Component Value Date    HGBA1C 12.1 (H) 2015    LDLCALC 52.0 (L) 2015    CREATININE 2.0 (H) 10/06/2018      Thyroid:   Lab Results   Component Value Date    TSH 0.300 (L) 02/18/2015    FREET4 1.22 02/18/2015     Anemia:   Lab Results   Component Value Date    IRON 10 (L) 02/18/2015    TIBC 189 (L) 02/18/2015    FERRITIN 444 (H) 02/18/2015    ZEEAFTCC65 591 02/18/2015    FOLATE 6.4 02/18/2015     Cardiac:   Lab Results   Component Value Date    TROPONINI <0.006 10/06/2018    BNP <10 10/06/2018     Urinalysis:   Lab Results   Component Value Date    COLORU Yellow 10/06/2018    SPECGRAV <=1.005 (A) 10/06/2018    NITRITE Negative 10/06/2018    KETONESU 1+ (A) 10/06/2018    UROBILINOGEN Negative 10/06/2018       Trended Lab Data:  Recent Labs   Lab  10/06/18   1612   WBC  5.67   HGB  13.9*   HCT  41.3   PLT  211   MCV  90   RDW  11.7   NA  129*   K  4.2   CL  85*   CO2  28   BUN  23   CREATININE  2.0*   GLU  737*   PROT  7.9   ALBUMIN  4.0   BILITOT  1.0   AST  34   ALKPHOS  97   ALT  34       Trended Cardiac Data:  Recent Labs   Lab  10/06/18   1612   TROPONINI  <0.006   BNP  <10       Microbiology Data:  None    Other Laboratory Data:  Lactate 1.1    Other Results:  EKG (my interpretation): None    Radiology:  Imaging Results          X-Ray Chest PA And Lateral (Final result)  Result time 10/06/18 16:37:03    Final result by Lani Milan MD (10/06/18 16:37:03)                 Impression:      Interval clearing of abnormal pulmonary parenchymal opacification with no other interval change since February 18, 2015.      Electronically signed by: Lani Milan MD  Date:    10/06/2018  Time:    16:37             Narrative:    EXAMINATION:  XR CHEST PA AND LATERAL    CLINICAL HISTORY:  Weakness    TECHNIQUE:  PA and lateral views of the chest were performed.    COMPARISON:  February 18, 2015    FINDINGS:  Mild dilatation of the trachea remains.The lungs are over expanded but free of lobar consolidation and alveolar edema, with normal appearance of pulmonary vasculature and no pleural effusion or pneumothorax.    The  cardiac silhouette is normal in size. The hilar and mediastinal contours are unremarkable.    Bones are intact.                                   Assessment:     Isreal Biggs Jr. is a 63 y.o. male with:  Patient Active Problem List    Diagnosis Date Noted    Hyperglycemia without ketosis 10/06/2018    KELVIN (acute kidney injury) 10/06/2018    Vitamin D deficiency 02/20/2015    Volume depletion 02/19/2015    Sepsis 02/19/2015    Lactic acidosis 02/19/2015    Anemia 02/19/2015    DM (diabetes mellitus), type 2, uncontrolled 02/19/2015    HTN (hypertension) 02/19/2015    Tobacco abuse 02/19/2015    CAP (community acquired pneumonia) 02/18/2015    DM (diabetes mellitus) 02/18/2015    Hyperglycemia 02/18/2015    Hypercalcemia 02/18/2015    Pneumonia 02/18/2015        Plan:     Hyperglycemia without ketosis/T2DM  - noncompliant with medications   on admission  - ABG 7.44/42/83, urine ketones 1+, betahydroxybuterate 1.1, serum Osm pending  - s/p 1 L NS bolus in ED and insulin 10 units, started NS @ 125 cc/hr  - recheck BMP, if BG not responsive to first dose of insulin, will repeat  - Will start on 12 units long acting with sliding scale after BMP results  - ordered A1C    Volume Depletion  -one month history of polyuria and weight loss, clinically dry appearing on exam  -will continue MIVF as above and monitor fluid status    KELVIN  -baseline Cr 0.9  Cr 2.0 on admission  -likely pre-renal in etiology, ordered FENa    HTN  -noncompliant with home ARB  -normotensive on admission, will monitor    Normocytic Anemia NOS  -Hbg 13.6 on admission, baseline 10.5  -anticipate Hbg drop after fluid resusitation  -Ordered Fe studies and TSH    Polysubstance abuse  -15+ pack year smoking history, regular binge drinking, cocaine positive Utox  -pre-contemplative stage of change  -counseled patient of the risks of continued behavior    Lines: PIV  Diet: Diabetic  DVT: Heparin  Code: Full    Dispo: observation  status pending clinical improvement    Pal Berry MD  [facility] HO-II  [facility] IM Service    [facility] Hospitalist Pager numbers:   [facility] Hospitalist Medicine Team A (Nahomi/Deion): [phone]  [facility] Hospitalist Medicine Team B (Clive/Jada):  [phone]

## 2018-10-07 NOTE — DISCHARGE INSTRUCTIONS
High Blood Pressure (Hypertension), Discharge Instructions (English) View Edit Remove   Diabetes, Diet (English) View Edit Remove   Diabetic Ketoacidosis (DKA), Preventing; Type 1 Diabetes and Your Child (English) View Edit Remove   Atorvastatin tablets (English) View Edit Remove

## 2018-10-07 NOTE — DISCHARGE SUMMARY
[facility] Discharge Summary    Primary Team: [facility] IM Team A  Attending Physician: Chacho Comer MD  Resident: Sonali  Intern: Jamal    Date of Admit: 10/6/2018  Date of Discharge: 10/7/2018    Discharge to: Home  Condition: Good    Discharge Diagnoses     Patient Active Problem List   Diagnosis    CAP (community acquired pneumonia)    DM (diabetes mellitus)    Hyperglycemia    Hypercalcemia    Pneumonia    Volume depletion    Sepsis    Lactic acidosis    Anemia    DM (diabetes mellitus), type 2, uncontrolled    HTN (hypertension)    Tobacco abuse    Vitamin D deficiency    Hyperglycemia without ketosis    KELVIN (acute kidney injury)    Cocaine abuse    Alcohol abuse    Polysubstance abuse    Hyperlipidemia associated with type 2 diabetes mellitus    Hypophosphatemia        Consultants and Procedures     Consultants:  None    Procedures:   None    Brief History of Present Illness      Isreal Biggs Jr. is a 63 y.o. male who  has a past medical history of Diabetes mellitus, Hypertension, and Renal disorder.  The patient presented to [facility] on 10/6/2018 with a primary complaint of Weakness (pt hasnt taken diabetic meds in 1 year, no having polyuria, weakness, sob and back pain.)  .     Pt has been noncompliant with T2DM meds and HTN meds for the past 1 year. For 1 month, having worsening fatigue, polydipsia, polyuria, and weight loss.  on arrival.    For the full HPI please refer to the History & Physical from this admission.    Hospital Course By Problem with Pertinent Findings     Hyperglycemia without ketosis/T2DM  - noncompliant with medications   on admission  - ABG 7.44/42/83, urine ketones 1+, betahydroxybuterate 1.1, serum Osm pending  - s/p 1 L NS bolus in ED and insulin 10 units, started NS @ 150 cc/hr  - A1C > 14.0  - discharge regimen: Levermir 10 units and Metformin 500 BID  - instructed patient to check BG in the AM and qHS     Volume Depletion,  improving  -one month history of polyuria and weight loss  -clinically improved on exam today     KELVIN, improving  -baseline Cr 0.9  Cr 2.0 on admission  -FEna <1% suggesting pre-renal  -Cr nearing baseline, 1.1 this AM     HTN  -noncompliant with home ARB  -normotensive on admission, no BP meds at discharge     HLD associated with T2DM  -Chol 202, HDL 30, ,   -ASCVD 26.4%  -will start Atorvastatin 40 mg daily     Normocytic Anemia OCD  -Hbg 13.6 on admission, baseline 10.5  -Hbg 12.5 this AM  -TSH wnl, ferritin 304, Iron panel pending     Hypophosphatemia  -Phos 2.0 on arrival, likely 2/2 poor nutrition   -repleted orally     Polysubstance abuse  -20+ pack year smoking history, regular binge drinking, cocaine positive Utox  -pre-contemplative stage of change  -counseled patient of the risks of continued behavior    Discharge Medications        Medication List      START taking these medications    atorvastatin 40 MG tablet  Commonly known as:  LIPITOR  Take 1 tablet (40 mg total) by mouth once daily.        CHANGE how you take these medications    blood sugar diagnostic Strp  1 strip by Misc.(Non-Drug; Combo Route) route every morning.  What changed:  when to take this     lancets Misc  Commonly known as:  ACCU-CHEK MULTICLIX LANCET  1 lancet by Misc.(Non-Drug; Combo Route) route every morning.  What changed:    · how much to take  · when to take this     metFORMIN 500 MG tablet  Commonly known as:  GLUCOPHAGE  Take 1 tablet (500 mg total) by mouth 2 (two) times daily with meals.  What changed:    · medication strength  · how much to take        CONTINUE taking these medications    aspirin 81 MG EC tablet  Commonly known as:  ECOTRIN     blood-glucose meter kit  Commonly known as:  FREESTYLE SYSTEM KIT  Use as instructed     ergocalciferol 50,000 unit Cap  Commonly known as:  ERGOCALCIFEROL  Take 1 capsule (50,000 Units total) by mouth every 7 days.     insulin glargine 100 unit/mL (3 mL) Inpn  pen  Commonly known as:  LANTUS SOLOSTAR  Inject 10 Units into the skin every evening.     sildenafil 100 MG tablet  Commonly known as:  VIAGRA        STOP taking these medications    fosinopril 20 MG tablet  Commonly known as:  MONOPRIL           Where to Get Your Medications      These medications were sent to [facility]/pharmacy #[id number] - [place] - [address]  [address], [place] [address]    Phone:  [phone]   · atorvastatin 40 MG tablet  · blood sugar diagnostic Strp  · insulin glargine 100 unit/mL (3 mL) Inpn pen  · lancets Misc  · metFORMIN 500 MG tablet     You can get these medications from any pharmacy    You don't need a prescription for these medications  · blood-glucose meter kit         Discharge Information:   Diet:  diabetic    Physical Activity:  As tolerated    Instructions:  1. Take all medications as prescribed  2. Keep all follow-up appointments  3. Return to the hospital or call your primary care physicians if any worsening symptoms such as fatigue, polydipsia, polyuria, uncontrolled n/v, CP, SOB or new symptoms occur.      Follow-Up Appointments:  PCP referral placed    Pal Berry MD  [facility] Internal Medicine, -II

## 2021-03-02 ENCOUNTER — IMMUNIZATION (OUTPATIENT)
Dept: PHARMACY | Facility: CLINIC | Age: 66
End: 2021-03-02
Payer: MEDICARE

## 2021-03-02 DIAGNOSIS — Z23 NEED FOR VACCINATION: Primary | ICD-10-CM

## 2021-03-30 ENCOUNTER — IMMUNIZATION (OUTPATIENT)
Dept: PHARMACY | Facility: CLINIC | Age: 66
End: 2021-03-30
Payer: MEDICARE

## 2021-03-30 DIAGNOSIS — Z23 NEED FOR VACCINATION: Primary | ICD-10-CM
